# Patient Record
Sex: MALE | Race: BLACK OR AFRICAN AMERICAN | NOT HISPANIC OR LATINO | Employment: UNEMPLOYED | URBAN - METROPOLITAN AREA
[De-identification: names, ages, dates, MRNs, and addresses within clinical notes are randomized per-mention and may not be internally consistent; named-entity substitution may affect disease eponyms.]

---

## 2022-04-28 DIAGNOSIS — M79.672 LEFT FOOT PAIN: Primary | ICD-10-CM

## 2022-05-11 ENCOUNTER — CLINICAL SUPPORT (OUTPATIENT)
Dept: REHABILITATION | Facility: OTHER | Age: 37
End: 2022-05-11
Attending: NURSE PRACTITIONER
Payer: MEDICAID

## 2022-05-11 ENCOUNTER — TELEPHONE (OUTPATIENT)
Dept: REHABILITATION | Facility: OTHER | Age: 37
End: 2022-05-11

## 2022-05-11 DIAGNOSIS — R26.2 DIFFICULTY WALKING: ICD-10-CM

## 2022-05-11 DIAGNOSIS — M79.672 LEFT FOOT PAIN: ICD-10-CM

## 2022-05-11 PROCEDURE — 97162 PT EVAL MOD COMPLEX 30 MIN: CPT | Mod: PN

## 2022-05-11 PROCEDURE — 97110 THERAPEUTIC EXERCISES: CPT | Mod: PN
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
## 2022-05-12 ENCOUNTER — CLINICAL SUPPORT (OUTPATIENT)
Dept: REHABILITATION | Facility: OTHER | Age: 37
End: 2022-05-12
Payer: MEDICAID

## 2022-05-12 DIAGNOSIS — R26.2 DIFFICULTY WALKING: ICD-10-CM

## 2022-05-12 DIAGNOSIS — M79.672 LEFT FOOT PAIN: Primary | ICD-10-CM

## 2022-05-12 PROCEDURE — 97110 THERAPEUTIC EXERCISES: CPT | Mod: PN,CQ

## 2022-05-12 NOTE — PROGRESS NOTES
"[facility] OUTPATIENT THERAPY AND WELLNESS   Physical Therapy Treatment Note     Name: Ralph Kirkpatrick  Clinic Number: [id number]    Therapy Diagnosis:   Encounter Diagnoses   Name Primary?    Left foot pain Yes    Difficulty walking      Physician: Kya Ferrera FNP-C    Visit Date: 5/12/2022    Physician Orders: PT Eval and Treat   Medical Diagnosis from Referral: M79.672 (ICD-10-CM) - Left foot pain   Evaluation Date: 5/11/2022  Authorization Period Expiration: 5/30/2022  Plan of Care Expiration: 7/11/2022  Visit # / Visits authorized: 2/1  (auth pending )  FOTO: #1/3  Follow up with MD: 5/17/2022     Precautions: Standard       PTA Visit #: 1/5     Time In: 1400  Time Out: 1507  Total Billable Time: 38 minutes    SUBJECTIVE     He was not compliant with home exercise program.  Response to previous treatment: "Felt good" States he has been walking on his foot because the MD told him he can put a little weight on it. States his pain is not as bad today. He is sore from sitting in his WC all day.   Functional change: n/a    Prior Level of Function: no limitation   Current Level of Function: severe limitation      Pain:  Current 8/10, worst 10/10, best 8/10   Location: L LE/foot  Description: stinging at lateral L ankle and shooting pain into L foot, constant knee pain   Aggravating Factors: being jammed into shoe, prolonged positioning   Easing Factors: standing up to get circulating, lying down with leg elevated, not wearing shoes or "boot that they gave me"      Patients goals: return to walking without AD and working       OBJECTIVE     Objective Measures updated at progress report unless specified.     Treatment     Ralph received the treatments listed below:      therapeutic exercises for 30 minutes:   L ankle alphabet x 2   L ankle PF/DF 5" each way x 20   L ankle circles CW/CCW x 30  +L LAQ 30x  +L SLR 3x10   +R SL hip abd 3x10  +L prone knee flexion 3x10 - towel roll above knee while in prone     manual " therapy techniques for 10 minutes:   Wound dressing - 4x4 gauze placed over open wounds, tubi  placed over gauze at L ankle/foot/distal LE      neuromuscular re-education for 8 minutes:    +Seated ankle wobble board AP, circles cw/ccw 30x ea         therapeutic activities for 00 minutes:      gait training for 00 minutes:         Patient Education and Home Exercises     Home Exercises Provided and Patient Education Provided     Education provided:   - Importance of avoiding FWB until cleared by MD  - Exercise form and rationale     Written Home Exercises Provided: Patient instructed to cont prior HEP. Exercises were reviewed and Ralph was able to demonstrate them prior to the end of the session.  Ralph demonstrated poor understanding of the education provided. See EMR under Patient Instructions for exercises provided during therapy sessions    ASSESSMENT   Pt tolerated exercise fair. Moderate verbal/tactile cuing was required to initiate exercise and to stay on task. Pt was very lethargic during today's session.  Pt was able to complete documented exercises with no change/ increased pain/discomfort beyond stated levels. Muscle weakness in peroneals and plantarflexors continue to remain notable at this time. ROM deficits continue to be present in all planes of motion both actively and passively. Moderate swelling was noted near the lateal malleolus is present. Pt received dressing change/assessment by supervising PT, Nilam Mcfarlane DPT. Therapist reached out to MD office concerning WB status.      Ralph Is progressing well towards his goals.        Patient prognosis is Good.       Pt will continue to benefit from skilled outpatient physical therapy to address the deficits listed in the problem list box on initial evaluation, provide pt/family education and to maximize pt's level of independence in the home and community environment.     Pt's spiritual, cultural and educational needs considered and pt  agreeable to plan of care and goals.     Anticipated Barriers for therapy: coping style, attitudes, transportation issues       Goals:  Short Term Goals: 3 weeks   (1) patient will be I with HOME EXERCISE PROGRAM (initial, not met)   (2) patient will obtain 8 deg L ankle DF to facilitate improved ability to stand, > 60 minutes (initial, not met)   (3) patient will obtain 18 deg eversion L ankle ACTIVE RANGE OF MOTION to facilitate improved ability to pivot for changing direction while walking (initial, not met)     Long Term Goals: 6 weeks   (1) patient will obtain 4+/5 L ankle PF MMT to facilitate improved ability to negotiate 15 stairs to enter apartment (initial, not met)   (2) patient will obtain left ankle/foot figure of 8 swelling measure of 52 cm to facilitate improved tolerance to wearing closed toe tie up shoes for work related duties (initial, not met)   (3) patient will obtain 4/5 L knee extension MMT to facilitate improved walking tolerance > 60 minutes (initial, not met)       PLAN     Plan of care Certification: 5/11/2022 to 7/11/2022     Focus on ankle strength/ROM and balance with emphasis on ADL performance.      Outpatient Physical Therapy 3 times weekly for 6 weeks to include the following interventions: Electrical Stimulation , re-eval , Gait Training, Manual Therapy, Moist Heat/ Ice, Neuromuscular Re-ed, Patient Education, Self Care, Therapeutic Activities and Therapeutic Exercise.      Physical therapist and physical therapy assistant(s) met face to face to discuss patient's treatment plan and progress towards established goals. Pt will be seen by a physical therapist minimally every 6th visit or every 30 days.      Michael Bautista, PTA

## 2022-05-17 ENCOUNTER — CLINICAL SUPPORT (OUTPATIENT)
Dept: REHABILITATION | Facility: OTHER | Age: 37
End: 2022-05-17
Payer: MEDICAID

## 2022-05-17 DIAGNOSIS — M79.672 LEFT FOOT PAIN: Primary | ICD-10-CM

## 2022-05-17 DIAGNOSIS — R26.2 DIFFICULTY WALKING: ICD-10-CM

## 2022-05-17 PROCEDURE — 97110 THERAPEUTIC EXERCISES: CPT | Mod: PN,CQ

## 2022-05-19 ENCOUNTER — DOCUMENTATION ONLY (OUTPATIENT)
Dept: REHABILITATION | Facility: OTHER | Age: 37
End: 2022-05-19
Payer: MEDICAID

## 2022-05-19 NOTE — PROGRESS NOTES
Patient was scheduled for a follow up physical therapy appointment at [facility] location on 5/19/2022. Patient failed to appear for the appointment without prior notification today.         Michael Bautista, PTA  5/19/2022

## 2022-05-20 ENCOUNTER — DOCUMENTATION ONLY (OUTPATIENT)
Dept: REHABILITATION | Facility: OTHER | Age: 37
End: 2022-05-20
Payer: MEDICAID

## 2022-05-20 NOTE — PROGRESS NOTES
Patient was scheduled for a follow up physical therapy appointment at [facility] location on 5/20/2022. Patient failed to appear for the appointment without prior notification today.       Michael Bautista, PTA  5/20/2022

## 2022-06-20 ENCOUNTER — CLINICAL SUPPORT (OUTPATIENT)
Dept: REHABILITATION | Facility: OTHER | Age: 37
End: 2022-06-20
Payer: MEDICAID

## 2022-06-20 DIAGNOSIS — R26.2 DIFFICULTY WALKING: ICD-10-CM

## 2022-06-20 DIAGNOSIS — M79.672 LEFT FOOT PAIN: Primary | ICD-10-CM

## 2022-06-20 PROCEDURE — 97110 THERAPEUTIC EXERCISES: CPT | Mod: PN

## 2022-06-20 NOTE — PLAN OF CARE
"[facility]   Physical Therapy Updated Plan of Care    Name: Ralph Kirkpatrick  Clinic Number: [id number]    Therapy Diagnosis:   Encounter Diagnoses   Name Primary?    Left foot pain Yes    Difficulty walking      Physician: Kya Ferrera FNP-C    Visit Date: 6/20/2022    Physician Orders: PT Eval and Treat   Medical Diagnosis from Referral: M79.672 (ICD-10-CM) - Left foot pain   Evaluation Date: 5/11/2022  Authorization Period Expiration: 5/30/2022  Plan of Care Expiration: 6/20/22- 8/20/22  Visit # / Visits authorized: 3/18  FOTO: #1/3  Follow up with MD: 5/17/2022     Precautions: Standard         Time In: 0101pm  Time Out: 0201pm  Total Billable Time: 60 minutes    SUBJECTIVE     He was not compliant with home exercise program.  Response to previous treatment: He is not feeling anything today. States he was at ortho appointment but left early because they were making him wait for hours and he did not want to miss PT.   Functional change: n/a    Prior Level of Function: no limitation   Current Level of Function: severe limitation      Pain:  Current 0/10, worst 10/10, best 8/10   Location: L LE/foot  Description: stinging at lateral L ankle and shooting pain into L foot, constant knee pain   Aggravating Factors: being jammed into shoe, prolonged positioning   Easing Factors: standing up to get circulating, lying down with leg elevated, not wearing shoes or "boot that they gave me"      Patients goals: return to walking without AD and working       OBJECTIVE   6/20/22:    Gait: Pt ambulates with a step to gait pattern and no AD. Pt states that he does not know what his precautions are but he walked around [place] [place] this weekend for several hours with no AD.     Range of Motion: AROM (PROM):  Knee Left Right   Flexion 133 141   Extension 0 0      Ankle Left  Right    Dorsiflexion -5 3   Plantarflexion 50 52   Inversion 10 33   Eversion 8 14      Strength:  Knee Left Right   Flexion " "3/5 4+/5   Extension  3/5 4/5         Ankle Left Right   Dorsiflexion 2/5 5/5   Plantarflexion 2/5 4+/5   Inversion 2/5 5/5   Eversion 1/5 4+/5      Joint Mobility:   L TCJ, STJ, METs - hypomobile        Limitation/Restriction for FOTO Foot Survey     Therapist reviewed FOTO scores for Ralph Kirkpatrick on 5/11/2022.   FOTO documents entered into [employer] - see Media section.     Limitation Score: 82%  Predicted Score: 51             Treatment     Ralph received the treatments listed below:    Charges based on 1:1 tx:   therapeutic exercises for 20 minutes:   L ankle alphabet x 2   L ankle PF/DF 5" each way x 30   L ankle circles CW/CCW x 30  L LAQ 30x   L SLR 3x10    R SL hip abd 3x10  L prone knee flexion 3x10 - towel roll above knee while in prone  +Ankle PF vs GTB 3x10 repetitions  +Updated POC      neuromuscular re-education for 8 minutes:    Seated ankle wobble board AP, circles cw/ccw 30x ea        Patient Education and Home Exercises     Home Exercises Provided and Patient Education Provided     Education provided:   - Importance of avoiding FWB until cleared by MD  - Exercise form and rationale     Written Home Exercises Provided: Patient instructed to cont prior HEP. Exercises were reviewed and Ralph was able to demonstrate them prior to the end of the session.  Ralph demonstrated poor understanding of the education provided. See EMR under Patient Instructions for exercises provided during therapy sessions    ASSESSMENT   Pt presents to physical therapy treatment with decreased active range of motion, decreased strength, poor posture, and increased pain due to left foot impairments. Pt is limited at this time 2/2 PT unable to obtain pt's precautions from orthopedic doctor and pt non compliant with PT or home exercise program.  These factors are negatively impacting the patient's ability to complete their activities of daily living and work duties. Pt is progressing towards their short and long term goals as " evidenced by decreased pain, improved strength and range of motion, and improved FOTO score. These goals remain appropriate for the plan of care. Pt would benefit from continued skilled physical therapy treatment to address these deficits so that they may return to their prior level of function with improved quality of life.        Ralph Is progressing well towards his goals.        Patient prognosis is Good.       Pt will continue to benefit from skilled outpatient physical therapy to address the deficits listed in the problem list box on initial evaluation, provide pt/family education and to maximize pt's level of independence in the home and community environment.     Pt's spiritual, cultural and educational needs considered and pt agreeable to plan of care and goals.     Anticipated Barriers for therapy: coping style, attitudes, transportation issues       Goals:  Short Term Goals: 3 weeks   (1) patient will be I with HOME EXERCISE PROGRAM (not met 6/20/22)   (2) patient will obtain 8 deg L ankle DF to facilitate improved ability to stand, > 60 minutes (initial, not met  6/20/22 )   (3) patient will obtain 18 deg eversion L ankle ACTIVE RANGE OF MOTION to facilitate improved ability to pivot for changing direction while walking (initial, not met 6/20/22)     Long Term Goals: 6 weeks   (1) patient will obtain 4+/5 L ankle PF MMT to facilitate improved ability to negotiate 15 stairs to enter apartment (initial, not met 6/20/22)   (2) patient will obtain left ankle/foot figure of 8 swelling measure of 52 cm to facilitate improved tolerance to wearing closed toe tie up shoes for work related duties (initial, not met 6/20/22)   (3) patient will obtain 4/5 L knee extension MMT to facilitate improved walking tolerance > 60 minutes (initial, not met 6/20/22)       PLAN     Plan of care Certification: 6/20/22- 8/20/22    Focus on ankle strength/ROM and balance with emphasis on ADL performance.      Outpatient Physical  Therapy 3 times weekly for 6 weeks to include the following interventions: Electrical Stimulation , re-eval , Gait Training, Manual Therapy, Moist Heat/ Ice, Neuromuscular Re-ed, Patient Education, Self Care, Therapeutic Activities and Therapeutic Exercise.      Physical therapist and physical therapy assistant(s) met face to face to discuss patient's treatment plan and progress towards established goals. Pt will be seen by a physical therapist minimally every 6th visit or every 30 days.      Mari Blackwell, PT

## 2022-06-20 NOTE — PROGRESS NOTES
"[facility]   Physical Therapy Updated Plan of Care    Name: Ralph Kirkpatrick  Clinic Number: [id number]    Therapy Diagnosis:   Encounter Diagnoses   Name Primary?    Left foot pain Yes    Difficulty walking      Physician: Kya Ferrera FNP-C    Visit Date: 6/20/2022    Physician Orders: PT Eval and Treat   Medical Diagnosis from Referral: M79.672 (ICD-10-CM) - Left foot pain   Evaluation Date: 5/11/2022  Authorization Period Expiration: 5/30/2022  Plan of Care Expiration: 6/20/22- 8/20/22  Visit # / Visits authorized: 3/18  FOTO: #1/3  Follow up with MD: 5/17/2022     Precautions: Standard         Time In: 0101pm  Time Out: 0201pm  Total Billable Time: 60 minutes    SUBJECTIVE     He was not compliant with home exercise program.  Response to previous treatment: He is not feeling anything today. States he was at ortho appointment but left early because they were making him wait for hours and he did not want to miss PT.   Functional change: n/a    Prior Level of Function: no limitation   Current Level of Function: severe limitation      Pain:  Current 0/10, worst 10/10, best 8/10   Location: L LE/foot  Description: stinging at lateral L ankle and shooting pain into L foot, constant knee pain   Aggravating Factors: being jammed into shoe, prolonged positioning   Easing Factors: standing up to get circulating, lying down with leg elevated, not wearing shoes or "boot that they gave me"      Patients goals: return to walking without AD and working       OBJECTIVE   6/20/22:    Gait: Pt ambulates with a step to gait pattern and no AD. Pt states that he does not know what his precautions are but he walked around [employer] this weekend for several hours with no AD.     Range of Motion: AROM (PROM):  Knee Left Right   Flexion 133 141   Extension 0 0      Ankle Left  Right    Dorsiflexion -5 3   Plantarflexion 50 52   Inversion 10 33   Eversion 8 14      Strength:  Knee Left Right   Flexion " "3/5 4+/5   Extension  3/5 4/5         Ankle Left Right   Dorsiflexion 2/5 5/5   Plantarflexion 2/5 4+/5   Inversion 2/5 5/5   Eversion 1/5 4+/5      Joint Mobility:   L TCJ, STJ, METs - hypomobile        Limitation/Restriction for FOTO Foot Survey     Therapist reviewed FOTO scores for Ralph Kirkpatrick on 5/11/2022.   FOTO documents entered into [employer] - see Media section.     Limitation Score: 82%  Predicted Score: 51             Treatment     Ralph received the treatments listed below:    Charges based on 1:1 tx:   therapeutic exercises for 20 minutes:   L ankle alphabet x 2   L ankle PF/DF 5" each way x 30   L ankle circles CW/CCW x 30  L LAQ 30x   L SLR 3x10    R SL hip abd 3x10  L prone knee flexion 3x10 - towel roll above knee while in prone  +Ankle PF vs GTB 3x10 repetitions  +Updated POC      neuromuscular re-education for 8 minutes:    Seated ankle wobble board AP, circles cw/ccw 30x ea        Patient Education and Home Exercises     Home Exercises Provided and Patient Education Provided     Education provided:   - Importance of avoiding FWB until cleared by MD  - Exercise form and rationale     Written Home Exercises Provided: Patient instructed to cont prior HEP. Exercises were reviewed and Ralph was able to demonstrate them prior to the end of the session.  Ralph demonstrated poor understanding of the education provided. See EMR under Patient Instructions for exercises provided during therapy sessions    ASSESSMENT   Pt presents to physical therapy treatment with decreased active range of motion, decreased strength, poor posture, and increased pain due to left foot impairments. Pt is limited at this time 2/2 PT unable to obtain pt's precautions from orthopedic doctor and pt non compliant with PT or home exercise program.  These factors are negatively impacting the patient's ability to complete their activities of daily living and work duties. Pt is progressing towards their short and long term goals as " evidenced by decreased pain, improved strength and range of motion, and improved FOTO score. These goals remain appropriate for the plan of care. Pt would benefit from continued skilled physical therapy treatment to address these deficits so that they may return to their prior level of function with improved quality of life.        Ralph Is progressing well towards his goals.        Patient prognosis is Good.       Pt will continue to benefit from skilled outpatient physical therapy to address the deficits listed in the problem list box on initial evaluation, provide pt/family education and to maximize pt's level of independence in the home and community environment.     Pt's spiritual, cultural and educational needs considered and pt agreeable to plan of care and goals.     Anticipated Barriers for therapy: coping style, attitudes, transportation issues       Goals:  Short Term Goals: 3 weeks   (1) patient will be I with HOME EXERCISE PROGRAM (not met 6/20/22)   (2) patient will obtain 8 deg L ankle DF to facilitate improved ability to stand, > 60 minutes (initial, not met  6/20/22 )   (3) patient will obtain 18 deg eversion L ankle ACTIVE RANGE OF MOTION to facilitate improved ability to pivot for changing direction while walking (initial, not met 6/20/22)     Long Term Goals: 6 weeks   (1) patient will obtain 4+/5 L ankle PF MMT to facilitate improved ability to negotiate 15 stairs to enter apartment (initial, not met 6/20/22)   (2) patient will obtain left ankle/foot figure of 8 swelling measure of 52 cm to facilitate improved tolerance to wearing closed toe tie up shoes for work related duties (initial, not met 6/20/22)   (3) patient will obtain 4/5 L knee extension MMT to facilitate improved walking tolerance > 60 minutes (initial, not met 6/20/22)       PLAN     Plan of care Certification: 6/20/22- 8/20/22    Focus on ankle strength/ROM and balance with emphasis on ADL performance.      Outpatient Physical  Therapy 3 times weekly for 6 weeks to include the following interventions: Electrical Stimulation , re-eval , Gait Training, Manual Therapy, Moist Heat/ Ice, Neuromuscular Re-ed, Patient Education, Self Care, Therapeutic Activities and Therapeutic Exercise.      Physical therapist and physical therapy assistant(s) met face to face to discuss patient's treatment plan and progress towards established goals. Pt will be seen by a physical therapist minimally every 6th visit or every 30 days.      Mari Blackwell, PT

## 2022-06-24 ENCOUNTER — CLINICAL SUPPORT (OUTPATIENT)
Dept: REHABILITATION | Facility: OTHER | Age: 37
End: 2022-06-24
Payer: MEDICAID

## 2022-06-24 DIAGNOSIS — R26.2 DIFFICULTY WALKING: ICD-10-CM

## 2022-06-24 DIAGNOSIS — M79.672 LEFT FOOT PAIN: Primary | ICD-10-CM

## 2022-06-24 PROCEDURE — 97110 THERAPEUTIC EXERCISES: CPT | Mod: PN
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
## 2022-06-27 ENCOUNTER — DOCUMENTATION ONLY (OUTPATIENT)
Dept: REHABILITATION | Facility: OTHER | Age: 37
End: 2022-06-27
Payer: MEDICAID

## 2022-06-27 NOTE — PROGRESS NOTES
Patient was scheduled for a follow up physical therapy appointment at [facility] location on 6/27/202. Patient failed to appear for the appointment without prior notification today.           Michael Bautista, PTA  6/27/2022

## 2022-06-28 ENCOUNTER — CLINICAL SUPPORT (OUTPATIENT)
Dept: REHABILITATION | Facility: OTHER | Age: 37
End: 2022-06-28
Payer: MEDICAID

## 2022-06-28 DIAGNOSIS — R26.2 DIFFICULTY WALKING: ICD-10-CM

## 2022-06-28 DIAGNOSIS — M79.672 LEFT FOOT PAIN: Primary | ICD-10-CM

## 2022-06-28 PROCEDURE — 97110 THERAPEUTIC EXERCISES: CPT | Mod: PN,CQ

## 2022-06-28 NOTE — PROGRESS NOTES
"[facility]   Physical Therapy Treatment Note    Name: Ralph Kirkpatrick  Clinic Number: [id number]    Therapy Diagnosis:   Encounter Diagnoses   Name Primary?    Left foot pain Yes    Difficulty walking      Physician: Kya Ferrera FNP-C    Visit Date: 6/28/2022    Physician Orders: PT Eval and Treat   Medical Diagnosis from Referral: M79.672 (ICD-10-CM) - Left foot pain   Evaluation Date: 5/11/2022  Authorization Period Expiration: 5/30/2022  Plan of Care Expiration: 6/20/22- 8/20/22  Visit # / Visits authorized: 6/18  FOTO: #1/3  Follow up with MD: 5/17/2022     Precautions: Standard         Time In: 1410 ( late arrival )  Time Out: 1500  Total Billable Time: 38 minutes    SUBJECTIVE     He was not compliant with home exercise program.  Response to previous treatment: Pt reports he is having pain in his L ankle. States his MD wont give him anymore pain medication and told him he has to see pain management.  States he does not have appointment scheduled with pain management at this time.   Functional change: Patient ambulates with pain.     Prior Level of Function: no limitation   Current Level of Function: severe limitation      Pain:  Current 5/10, worst 10/10, best 8/10   Location: L LE/foot  Description: stinging at lateral L ankle and shooting pain into L foot, constant knee pain   Aggravating Factors: being jammed into shoe, prolonged positioning   Easing Factors: standing up to get circulating, lying down with leg elevated, not wearing shoes or "boot that they gave me"      Patients goals: return to walking without AD and working       OBJECTIVE   6/20/22:    Gait: Pt ambulates with a step to gait pattern and no AD. Pt states that he does not know what his precautions are but he walked around [employer] this weekend for several hours with no AD.     Range of Motion: AROM (PROM):  Knee Left Right   Flexion 133 141   Extension 0 0      Ankle Left  Right    Dorsiflexion -5 3 " "  Plantarflexion 50 52   Inversion 10 33   Eversion 8 14      Strength:  Knee Left Right   Flexion 3/5 4+/5   Extension  3/5 4/5         Ankle Left Right   Dorsiflexion 2/5 5/5   Plantarflexion 2/5 4+/5   Inversion 2/5 5/5   Eversion 1/5 4+/5      Joint Mobility:   L TCJ, STJ, METs - hypomobile        Limitation/Restriction for FOTO Foot Survey     Therapist reviewed FOTO scores for Ralph Kirkpatrick on 5/11/2022.   FOTO documents entered into [employer] - see Media section.     Limitation Score: 82%  Predicted Score: 51             Treatment     Ralph received the treatments listed below:    Charges based on 1:1 tx:     therapeutic exercises for 30 minutes:   L ankle alphabet x 2   L ankle PF/DF 5" each way x 30   L ankle circles CW/CCW x 30   L LAQ 30x 2#  L SLR 3x10    R SL hip abd 3x10  L prone knee flexion 3x10 - towel roll above knee while in prone   Ankle PF vs GTB 3x10 repetitions      neuromuscular re-education for 8 minutes:    Seated ankle wobble board AP, circles cw/ccw 30x ea        Patient Education and Home Exercises     Home Exercises Provided and Patient Education Provided     Education provided:   - Importance of avoiding FWB until cleared by MD  - Exercise form and rationale     Written Home Exercises Provided: Patient instructed to cont prior HEP. Exercises were reviewed and Ralph was able to demonstrate them prior to the end of the session.  Ralph demonstrated poor understanding of the education provided. See EMR under Patient Instructions for exercises provided during therapy sessions    ASSESSMENT   Pt presents to therapy with antalgic gait. Pt reports not using AD for ambulation. Weakness /decrased ROM continues to be noted in L ankle. Pt appeared lethargic and fell asleep 3+ times while sitting performing ankle T-band exercises. Pt put forth minimal effort and showed little interest in performing his exercises correctly. Pt has poor attendance with PT with multiple NS/cancellations. Pt education on the " importance of performing his HEP in conjunction with skilled PT program. Pt received GTB for ankle 4-way for HEP. Pt expressed verbal understanding to all.           Ralph Is progressing well towards his goals.        Patient prognosis is Good.       Pt will continue to benefit from skilled outpatient physical therapy to address the deficits listed in the problem list box on initial evaluation, provide pt/family education and to maximize pt's level of independence in the home and community environment.     Pt's spiritual, cultural and educational needs considered and pt agreeable to plan of care and goals.     Anticipated Barriers for therapy: coping style, attitudes, transportation issues       Goals:  Short Term Goals: 3 weeks   (1) patient will be I with HOME EXERCISE PROGRAM (not met 6/20/22)   (2) patient will obtain 8 deg L ankle DF to facilitate improved ability to stand, > 60 minutes (initial, not met  6/20/22 )   (3) patient will obtain 18 deg eversion L ankle ACTIVE RANGE OF MOTION to facilitate improved ability to pivot for changing direction while walking (initial, not met 6/20/22)     Long Term Goals: 6 weeks   (1) patient will obtain 4+/5 L ankle PF MMT to facilitate improved ability to negotiate 15 stairs to enter apartment (initial, not met 6/20/22)   (2) patient will obtain left ankle/foot figure of 8 swelling measure of 52 cm to facilitate improved tolerance to wearing closed toe tie up shoes for work related duties (initial, not met 6/20/22)   (3) patient will obtain 4/5 L knee extension MMT to facilitate improved walking tolerance > 60 minutes (initial, not met 6/20/22)       PLAN     Plan of care Certification: 6/20/22- 8/20/22    Focus on ankle strength/ROM and balance with emphasis on ADL performance.      Outpatient Physical Therapy 3 times weekly for 6 weeks to include the following interventions: Electrical Stimulation , re-eval , Gait Training, Manual Therapy, Moist Heat/ Ice,  Neuromuscular Re-ed, Patient Education, Self Care, Therapeutic Activities and Therapeutic Exercise.      Physical therapist and physical therapy assistant(s) met face to face to discuss patient's treatment plan and progress towards established goals. Pt will be seen by a physical therapist minimally every 6th visit or every 30 days.      Michael Bautista, PTA

## 2022-06-29 ENCOUNTER — CLINICAL SUPPORT (OUTPATIENT)
Dept: REHABILITATION | Facility: OTHER | Age: 37
End: 2022-06-29
Payer: MEDICAID

## 2022-06-29 DIAGNOSIS — R26.2 DIFFICULTY WALKING: ICD-10-CM

## 2022-06-29 DIAGNOSIS — M79.672 LEFT FOOT PAIN: Primary | ICD-10-CM

## 2022-06-29 PROCEDURE — 97110 THERAPEUTIC EXERCISES: CPT | Mod: PN,CQ

## 2022-06-29 NOTE — PROGRESS NOTES
"[facility]   Physical Therapy Treatment Note    Name: Ralph Kirkpatrick  Clinic Number: [id number]    Therapy Diagnosis:   Encounter Diagnoses   Name Primary?    Left foot pain Yes    Difficulty walking      Physician: Kya Ferrera FNP-C    Visit Date: 6/29/2022    Physician Orders: PT Eval and Treat   Medical Diagnosis from Referral: M79.672 (ICD-10-CM) - Left foot pain   Evaluation Date: 5/11/2022  Authorization Period Expiration: 5/30/2022  Plan of Care Expiration: 6/20/22- 8/20/22  Visit # / Visits authorized: 7/18  FOTO: #1/3  Follow up with MD: 5/17/2022     Precautions: Standard         Time In: 1430  Time Out: 1530  Total Billable Time: 55 minutes    SUBJECTIVE     He was not compliant with home exercise program.  Response to previous treatment: Pt reports he is feeling okay today. Not having as much pain as he just woke up. Did not feel sore after last session.   Functional change: Patient ambulates with pain.     Prior Level of Function: no limitation   Current Level of Function: severe limitation      Pain:  Current 4/10, worst 10/10, best 4/10   Location: L LE/foot  Description: stinging at lateral L ankle and shooting pain into L foot, constant knee pain   Aggravating Factors: being jammed into shoe, prolonged positioning   Easing Factors: standing up to get circulating, lying down with leg elevated, not wearing shoes or "boot that they gave me"      Patients goals: return to walking without AD and working       OBJECTIVE   6/20/22:    Gait: Pt ambulates with a step to gait pattern and no AD. Pt states that he does not know what his precautions are but [facility] hours with no AD.     Range of Motion: AROM (PROM):  Knee Left Right   Flexion 133 141   Extension 0 0      Ankle Left  Right    Dorsiflexion -5 3   Plantarflexion 50 52   Inversion 10 33   Eversion 8 14      Strength:  Knee Left Right   Flexion 3/5 4+/5   Extension  " "3/5 4/5         Ankle Left Right   Dorsiflexion 2/5 5/5   Plantarflexion 2/5 4+/5   Inversion 2/5 5/5   Eversion 1/5 4+/5      Joint Mobility:   L TCJ, STJ, METs - hypomobile        Limitation/Restriction for FOTO Foot Survey     Therapist reviewed FOTO scores for Ralph Kirkpatrick on 5/11/2022.   FOTO documents entered into [facility] - see Media section.     Limitation Score: 82%  Predicted Score: 51             Treatment     Ralph received the treatments listed below:    Charges based on 1:1 tx:     therapeutic exercises for 39 minutes:   +Recumbent bike x8 min - to improve ankle ROM and mobility  L ankle alphabet 1#,  x 2   L ankle PF/DF 1#, 5" each way x 30   L ankle circles 1#, CW/CCW x 30   L LAQ 30x 3#  L SLR 3x10 2#   R SL hip abd 2# 3x10  L prone knee flexion 2# 3x10 - towel roll above knee while in prone   Ankle PF vs GTB 3x10 repetitions      manual interventions for 8 min consisting of:  +Gentle PROM in all planes  +Tibiotalar JM gr II AP glides     neuromuscular re-education for 8 minutes:    Seated ankle wobble board AP, circles cw/ccw 30x ea        Patient Education and Home Exercises     Home Exercises Provided and Patient Education Provided     Education provided:   - Importance of avoiding FWB until cleared by MD  - Exercise form and rationale     Written Home Exercises Provided: Patient instructed to cont prior HEP. Exercises were reviewed and Ralph was able to demonstrate them prior to the end of the session.  Ralph demonstrated poor understanding of the education provided. See EMR under Patient Instructions for exercises provided during therapy sessions    ASSESSMENT   Pt presents to therapy without AD with continued antalgic gait. Pt was slightly more alert and orient today however frequent cuing to initiate exercise and stay on task. ROM deficits continue to remain notable in all planes of motion.  Added manual interventions/gentle PROM for improved ankle mobility and ROM. Moderate swelling continues to " be noted near the lateral malleolus.      Ralph Is progressing well towards his goals.        Patient prognosis is Good.       Pt will continue to benefit from skilled outpatient physical therapy to address the deficits listed in the problem list box on initial evaluation, provide pt/family education and to maximize pt's level of independence in the home and community environment.     Pt's spiritual, cultural and educational needs considered and pt agreeable to plan of care and goals.     Anticipated Barriers for therapy: coping style, attitudes, transportation issues       Goals:  Short Term Goals: 3 weeks   (1) patient will be I with HOME EXERCISE PROGRAM (not met 6/20/22)   (2) patient will obtain 8 deg L ankle DF to facilitate improved ability to stand, > 60 minutes (initial, not met  6/20/22 )   (3) patient will obtain 18 deg eversion L ankle ACTIVE RANGE OF MOTION to facilitate improved ability to pivot for changing direction while walking (initial, not met 6/20/22)     Long Term Goals: 6 weeks   (1) patient will obtain 4+/5 L ankle PF MMT to facilitate improved ability to negotiate 15 stairs to enter apartment (initial, not met 6/20/22)   (2) patient will obtain left ankle/foot figure of 8 swelling measure of 52 cm to facilitate improved tolerance to wearing closed toe tie up shoes for work related duties (initial, not met 6/20/22)   (3) patient will obtain 4/5 L knee extension MMT to facilitate improved walking tolerance > 60 minutes (initial, not met 6/20/22)       PLAN     Plan of care Certification: 6/20/22- 8/20/22    Focus on ankle strength/ROM and balance with emphasis on ADL performance.      Outpatient Physical Therapy 3 times weekly for 6 weeks to include the following interventions: Electrical Stimulation , re-eval , Gait Training, Manual Therapy, Moist Heat/ Ice, Neuromuscular Re-ed, Patient Education, Self Care, Therapeutic Activities and Therapeutic Exercise.      Physical therapist and physical  therapy assistant(s) met face to face to discuss patient's treatment plan and progress towards established goals. Pt will be seen by a physical therapist minimally every 6th visit or every 30 days.      Michael Bautista, PTA

## 2022-07-01 ENCOUNTER — CLINICAL SUPPORT (OUTPATIENT)
Dept: REHABILITATION | Facility: OTHER | Age: 37
End: 2022-07-01
Payer: MEDICAID

## 2022-07-01 DIAGNOSIS — M79.672 LEFT FOOT PAIN: Primary | ICD-10-CM

## 2022-07-01 DIAGNOSIS — R26.2 DIFFICULTY WALKING: ICD-10-CM

## 2022-07-01 PROCEDURE — 97110 THERAPEUTIC EXERCISES: CPT | Mod: PN

## 2022-07-01 NOTE — PROGRESS NOTES
"[facility]   Physical Therapy Treatment Note    Name: Ralph Kirkpatrick  Clinic Number: [id number]    Therapy Diagnosis:   Encounter Diagnoses   Name Primary?    Left foot pain Yes    Difficulty walking      Physician: Kya Ferrera FNP-C    Visit Date: 7/1/2022    Physician Orders: PT Eval and Treat   Medical Diagnosis from Referral: M79.672 (ICD-10-CM) - Left foot pain   Evaluation Date: 5/11/2022  Authorization Period Expiration: 5/30/2022  Plan of Care Expiration: 6/20/22- 8/20/22  Visit # / Visits authorized: 7/18  FOTO: #1/3  Follow up with MD: 5/17/2022     Precautions: Standard         Time In: 0300pm  Time Out: 0400pm  Total Billable Time: 60 minutes    SUBJECTIVE     He was not compliant with home exercise program.  Response to previous treatment: Pt reports he is having a great deal of foot pain today.   Functional change: Patient ambulates with pain.     Prior Level of Function: no limitation   Current Level of Function: severe limitation      Pain:  Current 4/10, worst 10/10, best 4/10   Location: L LE/foot  Description: stinging at lateral L ankle and shooting pain into L foot, constant knee pain   Aggravating Factors: being jammed into shoe, prolonged positioning   Easing Factors: standing up to get circulating, lying down with leg elevated, not wearing shoes or "boot that they gave me"      Patients goals: return to walking without AD and working       OBJECTIVE   6/20/22:    Gait: Pt ambulates with a step to gait pattern and no AD. Pt states that he does not know what his precautions are but [place] [facility] [place] hours with no AD.     Range of Motion: AROM (PROM):  Knee Left Right   Flexion 133 141   Extension 0 0      Ankle Left  Right    Dorsiflexion -5 3   Plantarflexion 50 52   Inversion 10 33   Eversion 8 14      Strength:  Knee Left Right   Flexion 3/5 4+/5   Extension  3/5 4/5         Ankle Left Right   Dorsiflexion 2/5 5/5 " "  Plantarflexion 2/5 4+/5   Inversion 2/5 5/5   Eversion 1/5 4+/5      Joint Mobility:   L TCJ, STJ, METs - hypomobile        Limitation/Restriction for FOTO Foot Survey     Therapist reviewed FOTO scores for Ralph Kirkpatrick on 5/11/2022.   FOTO documents entered into [facility] - see Media section.     Limitation Score: 82%  Predicted Score: 51             Treatment     Ralph received the treatments listed below:    Charges based on 1:1 tx:   Exercises in BOLD completed today    therapeutic exercises for 39 minutes:   +Recumbent bike x8 min - to improve ankle ROM and mobility  L ankle alphabet 1#,  x 2   L ankle PF/DF 1#, 5" each way x 30   L ankle circles 1#, CW/CCW x 30   L LAQ 30x 3#  L SLR 3x10 2#   R SL hip abd 2# 3x10  L prone knee flexion 2# 3x10 - towel roll above knee while in prone   Ankle PF vs GTB 3x10 repetitions  +Sit <> stand vs 10# KB 3x10 repetitions  +Squats in // bars 3x10 repetitions  + Lateral band walks 2x 20' vs GTB    manual interventions for 8 min consisting of:  +Gentle PROM in all planes  +Tibiotalar JM gr II AP glides     neuromuscular re-education for 8 minutes:    Seated ankle wobble board AP, circles cw/ccw 30x ea        Patient Education and Home Exercises     Home Exercises Provided and Patient Education Provided     Education provided:   - Importance of avoiding FWB until cleared by MD  - Exercise form and rationale     Written Home Exercises Provided: Patient instructed to cont prior HEP. Exercises were reviewed and Ralph was able to demonstrate them prior to the end of the session.  Ralph demonstrated poor understanding of the education provided. See EMR under Patient Instructions for exercises provided during therapy sessions    ASSESSMENT   Pt presents to therapy without AD with continued antalgic gait. Pt was slightly more alert and orient today however frequent cuing to initiate exercise and stay on task. ROM deficits continue to remain notable in all planes of motion.  Added manual " interventions/gentle PROM for improved ankle mobility and ROM. Moderate swelling continues to be noted near the lateral malleolus.      Ralph Is progressing well towards his goals.        Patient prognosis is Good.       Pt will continue to benefit from skilled outpatient physical therapy to address the deficits listed in the problem list box on initial evaluation, provide pt/family education and to maximize pt's level of independence in the home and community environment.     Pt's spiritual, cultural and educational needs considered and pt agreeable to plan of care and goals.     Anticipated Barriers for therapy: coping style, attitudes, transportation issues       Goals:  Short Term Goals: 3 weeks   (1) patient will be I with HOME EXERCISE PROGRAM (not met 6/20/22)   (2) patient will obtain 8 deg L ankle DF to facilitate improved ability to stand, > 60 minutes (initial, not met  6/20/22 )   (3) patient will obtain 18 deg eversion L ankle ACTIVE RANGE OF MOTION to facilitate improved ability to pivot for changing direction while walking (initial, not met 6/20/22)     Long Term Goals: 6 weeks   (1) patient will obtain 4+/5 L ankle PF MMT to facilitate improved ability to negotiate 15 stairs to enter apartment (initial, not met 6/20/22)   (2) patient will obtain left ankle/foot figure of 8 swelling measure of 52 cm to facilitate improved tolerance to wearing closed toe tie up shoes for work related duties (initial, not met 6/20/22)   (3) patient will obtain 4/5 L knee extension MMT to facilitate improved walking tolerance > 60 minutes (initial, not met 6/20/22)       PLAN     Plan of care Certification: 6/20/22- 8/20/22    Focus on ankle strength/ROM and balance with emphasis on ADL performance.      Outpatient Physical Therapy 3 times weekly for 6 weeks to include the following interventions: Electrical Stimulation , re-eval , Gait Training, Manual Therapy, Moist Heat/ Ice, Neuromuscular Re-ed, Patient Education,  Self Care, Therapeutic Activities and Therapeutic Exercise.      Physical therapist and physical therapy assistant(s) met face to face to discuss patient's treatment plan and progress towards established goals. Pt will be seen by a physical therapist minimally every 6th visit or every 30 days.      Mari Blackwell, PT

## 2022-07-07 ENCOUNTER — DOCUMENTATION ONLY (OUTPATIENT)
Dept: REHABILITATION | Facility: OTHER | Age: 37
End: 2022-07-07
Payer: MEDICAID

## 2022-07-07 NOTE — PROGRESS NOTES
Patient was scheduled for a follow up physical therapy appointment at [facility] location on 7/7/2022. Patient failed to appear for the appointment without prior notification today.           Michael Bautista, PTA  7/7/2022

## 2022-07-11 ENCOUNTER — DOCUMENTATION ONLY (OUTPATIENT)
Dept: REHABILITATION | Facility: OTHER | Age: 37
End: 2022-07-11
Payer: MEDICAID

## 2022-07-11 NOTE — PROGRESS NOTES
Patient was scheduled for a physical therapy treatment appointment at [facility] on 7/11/22. Patient failed to appear for the appointment without prior notification today.     Mari Blackwell , PT

## 2024-01-01 NOTE — PROGRESS NOTES
"[facility]   Physical Therapy Treatment Note     Name: Ralph Kirkpatrick  Clinic Number: [id number]    Therapy Diagnosis:   Encounter Diagnoses   Name Primary?    Left foot pain Yes    Difficulty walking      Physician: Kya Ferrera FNP-C    Visit Date: 5/17/2022    Physician Orders: PT Eval and Treat   Medical Diagnosis from Referral: M79.672 (ICD-10-CM) - Left foot pain   Evaluation Date: 5/11/2022  Authorization Period Expiration: 5/30/2022  Plan of Care Expiration: 7/11/2022  Visit # / Visits authorized: 3/1  (auth pending )  FOTO: #1/3  Follow up with MD: 5/17/2022     Precautions: Standard       PTA Visit #: 2/5     Time In: 1630 ( 30 minutes late)  Time Out: 1708  Total Billable Time: 38 minutes    SUBJECTIVE     He was not compliant with home exercise program.  Response to previous treatment: He is not feeling anything today. States he was at ortho appointment but left early because they were making him wait for hours and he did not want to miss PT.   Functional change: n/a    Prior Level of Function: no limitation   Current Level of Function: severe limitation      Pain:  Current 0/10, worst 10/10, best 8/10   Location: L LE/foot  Description: stinging at lateral L ankle and shooting pain into L foot, constant knee pain   Aggravating Factors: being jammed into shoe, prolonged positioning   Easing Factors: standing up to get circulating, lying down with leg elevated, not wearing shoes or "boot that they gave me"      Patients goals: return to walking without AD and working       OBJECTIVE     Objective Measures updated at progress report unless specified.     Treatment     Ralph received the treatments listed below:    Charges based on 1:1 tx:   therapeutic exercises for 20 minutes:   L ankle alphabet x 2   L ankle PF/DF 5" each way x 30   L ankle circles CW/CCW x 30  L LAQ 30x   L SLR 3x10    R SL hip abd 3x10  L prone knee flexion 3x10 - towel roll above knee while in prone   "   manual therapy techniques for 10 minutes:   Wound dressing - 4x4 gauze placed over open wounds, tubi  placed over gauze at L ankle/foot/distal LE      neuromuscular re-education for 8 minutes:    Seated ankle wobble board AP, circles cw/ccw 30x ea         therapeutic activities for 00 minutes:      gait training for 00 minutes:         Patient Education and Home Exercises     Home Exercises Provided and Patient Education Provided     Education provided:   - Importance of avoiding FWB until cleared by MD  - Exercise form and rationale     Written Home Exercises Provided: Patient instructed to cont prior HEP. Exercises were reviewed and Ralph was able to demonstrate them prior to the end of the session.  Ralph demonstrated poor understanding of the education provided. See EMR under Patient Instructions for exercises provided during therapy sessions    ASSESSMENT   Pt arrived to [place] 30 min late. Limited TX 2/2 time constraints. Emphasis was focused on global hip/knee strengthening as well as neuromuscular re-education/ankle AROM. Pt received wound dressing as notable wound drainage was present. Pt reported feeling comfortable with new bandage and was instructed to follow up with wound care for further consultation/TX. Pt verbally expressed understanding. After consulting with the therapist of record, this PTA was informed this clinic has not received guidance on WB from MD office despite reaching out for a status/update. Will continue to treat as NWB until further guidance  is received from MD or supervising PT advises to progress WB status.       Ralph Is progressing well towards his goals.        Patient prognosis is Good.       Pt will continue to benefit from skilled outpatient physical therapy to address the deficits listed in the problem list box on initial evaluation, provide pt/family education and to maximize pt's level of independence in the home and community environment.     Pt's spiritual, cultural  and educational needs considered and pt agreeable to plan of care and goals.     Anticipated Barriers for therapy: coping style, attitudes, transportation issues       Goals:  Short Term Goals: 3 weeks   (1) patient will be I with HOME EXERCISE PROGRAM (initial, not met)   (2) patient will obtain 8 deg L ankle DF to facilitate improved ability to stand, > 60 minutes (initial, not met)   (3) patient will obtain 18 deg eversion L ankle ACTIVE RANGE OF MOTION to facilitate improved ability to pivot for changing direction while walking (initial, not met)     Long Term Goals: 6 weeks   (1) patient will obtain 4+/5 L ankle PF MMT to facilitate improved ability to negotiate 15 stairs to enter apartment (initial, not met)   (2) patient will obtain left ankle/foot figure of 8 swelling measure of 52 cm to facilitate improved tolerance to wearing closed toe tie up shoes for work related duties (initial, not met)   (3) patient will obtain 4/5 L knee extension MMT to facilitate improved walking tolerance > 60 minutes (initial, not met)       PLAN     Plan of care Certification: 5/11/2022 to 7/11/2022     Focus on ankle strength/ROM and balance with emphasis on ADL performance.      Outpatient Physical Therapy 3 times weekly for 6 weeks to include the following interventions: Electrical Stimulation , re-eval , Gait Training, Manual Therapy, Moist Heat/ Ice, Neuromuscular Re-ed, Patient Education, Self Care, Therapeutic Activities and Therapeutic Exercise.      Physical therapist and physical therapy assistant(s) met face to face to discuss patient's treatment plan and progress towards established goals. Pt will be seen by a physical therapist minimally every 6th visit or every 30 days.      Michael Bautista, PTA         -Fewer than 5 wet diapers per day